# Patient Record
Sex: MALE | Race: BLACK OR AFRICAN AMERICAN | Employment: FULL TIME | URBAN - METROPOLITAN AREA
[De-identification: names, ages, dates, MRNs, and addresses within clinical notes are randomized per-mention and may not be internally consistent; named-entity substitution may affect disease eponyms.]

---

## 2023-11-22 DIAGNOSIS — L70.0 ACNE VULGARIS: Primary | ICD-10-CM

## 2023-11-27 RX ORDER — BENZOYL PEROXIDE 50 MG/ML
LIQUID TOPICAL
Qty: 148 G | Refills: 9 | Status: SHIPPED | OUTPATIENT
Start: 2023-11-27 | End: 2024-06-04 | Stop reason: WASHOUT

## 2024-01-15 ENCOUNTER — APPOINTMENT (OUTPATIENT)
Dept: DERMATOLOGY | Facility: CLINIC | Age: 29
End: 2024-01-15
Payer: COMMERCIAL

## 2024-06-04 ENCOUNTER — TELEMEDICINE (OUTPATIENT)
Dept: DERMATOLOGY | Facility: CLINIC | Age: 29
End: 2024-06-04
Payer: COMMERCIAL

## 2024-06-04 DIAGNOSIS — L70.0 ACNE VULGARIS: Primary | ICD-10-CM

## 2024-06-04 PROCEDURE — 99214 OFFICE O/P EST MOD 30 MIN: CPT | Performed by: DERMATOLOGY

## 2024-06-04 RX ORDER — BENZOYL PEROXIDE 100 MG/ML
LIQUID TOPICAL EVERY MORNING
Qty: 296 ML | Refills: 11 | Status: SHIPPED | OUTPATIENT
Start: 2024-06-04 | End: 2025-06-04

## 2024-06-04 RX ORDER — TRETINOIN 0.25 MG/G
CREAM TOPICAL
Qty: 90 G | Refills: 11 | Status: SHIPPED | OUTPATIENT
Start: 2024-06-04

## 2024-06-04 RX ORDER — CLINDAMYCIN PHOSPHATE 10 UG/ML
LOTION TOPICAL 2 TIMES DAILY
Qty: 240 ML | Refills: 11 | Status: SHIPPED | OUTPATIENT
Start: 2024-06-04 | End: 2025-06-04

## 2024-06-04 NOTE — PROGRESS NOTES
Subjective     Les Lopez is a 29 y.o. male who presents for the following: No chief complaint on file..     Patient last saw Dr. Onel Carballo 1/2023 for his acne. Patient had been on BPO wash, clindamycin lotion, and tazarac 0.05% gel; however, he had never really used the tazarac and he has been off of the BPO and clindamycin for several months. In addition, he would rinse off the BPO immediately after applying to the skin instead of leaving it on for a few minutes. Dr. Onel Carballo also prescribed the patient PO doxycyline but it made him nauseous so he was only on it for a week. Dr. Onel Carballo had discussed possible Accutane with patient in the past, but his cholesterol/triglycerides are quite elevated and his PCP advised him not to start on the medication.      Review of Systems:  No other skin or systemic complaints other than what is documented elsewhere in the note.    The following portions of the chart were reviewed this encounter and updated as appropriate:   Allergies         Skin Cancer History  No skin cancer on file.      Specialty Problems    None       Objective   Well appearing patient in no apparent distress; mood and affect are within normal limits.    A focused skin examination was performed. All findings within normal limits unless otherwise noted below.    Assessment/Plan   1. Acne vulgaris  Chest (Upper Torso, Anterior), Head - Anterior (Face), Torso - Posterior (Back)  Involving the back/chest > face are scattered comedones and inflammatory papulopustules with PIH.     Acne vulgaris; comedonal and papulonodular with PIH  - Back/chest > face   - Given that patient had not been using the topical medications currently in the past, will restart his topical regimen and assess improvement in 6 months   - START BPO 10% wash daily to face and body. Instructed patient to leave on skin for 5-10 minutes before rinsing off.   - START clindamycin 1% lotion BID to face and body.   - START  tretinoin 0.025% at night to face and body. Instructed patient to apply pea-sized amount to whole face and thin layer to affected areas on the trunk starting 2-3x a week and increasing to nightly as tolerated.  - Doxycyline made patient nauseous in the past.  - Patient is not a good candidate for isotretinoin given lab abnormalities and difficulty with compliance.  - Follow up [facility] in 6 months     Related Procedures  Follow Up In Dermatology - Established Patient    Related Medications  benzoyl peroxide (PanoxyL) 10 % external wash  Apply topically once daily in the morning. Apply to face, chest, back. Leave on skin for 5-10 minutes before rinsing off.    clindamycin (Cleocin T) 1 % lotion  Apply topically 2 times a day. Apply to face, chest, back twice daily    tretinoin (Retin-A) 0.025 % cream  Apply a small 1/2 pea sized amount to clean dry face at bedtime. Apply thin layer to chest/back at bedtime. Start off 2x/week and increase as tolerated.      RTC in 6 months for [facility]  Tracy Herrera MD     I saw and evaluated the patient. I personally obtained the key and critical portions of the history and physical exam or was physically present for key and critical portions performed by the resident/fellow. I reviewed the resident/fellow's documentation and discussed the patient with the resident/fellow. I agree with the resident/fellow's medical decision making as documented in the note.    Sean Sparks MD PhD

## 2024-10-06 ENCOUNTER — OFFICE VISIT (OUTPATIENT)
Dept: URGENT CARE | Age: 29
End: 2024-10-06
Payer: COMMERCIAL

## 2024-10-06 VITALS
OXYGEN SATURATION: 98 % | TEMPERATURE: 98.2 F | HEART RATE: 70 BPM | SYSTOLIC BLOOD PRESSURE: 140 MMHG | DIASTOLIC BLOOD PRESSURE: 87 MMHG | RESPIRATION RATE: 16 BRPM

## 2024-10-06 DIAGNOSIS — Z72.51 HIGH RISK HETEROSEXUAL BEHAVIOR: Primary | ICD-10-CM

## 2024-10-06 DIAGNOSIS — Z20.2 STD EXPOSURE: ICD-10-CM

## 2024-10-06 LAB
POC APPEARANCE, URINE: CLEAR
POC BLOOD, URINE: NEGATIVE
POC COLOR, URINE: YELLOW
POC GLUCOSE, URINE: NEGATIVE MG/DL
POC KETONES, URINE: ABNORMAL MG/DL
POC LEUKOCYTES, URINE: ABNORMAL
POC NITRITE,URINE: NEGATIVE
POC PH, URINE: 6 PH
POC PROTEIN, URINE: ABNORMAL MG/DL
POC SPECIFIC GRAVITY, URINE: 1.02
POC UROBILINOGEN, URINE: 1 EU/DL

## 2024-10-06 PROCEDURE — 87086 URINE CULTURE/COLONY COUNT: CPT

## 2024-10-06 PROCEDURE — 87591 N.GONORRHOEAE DNA AMP PROB: CPT

## 2024-10-06 PROCEDURE — 87661 TRICHOMONAS VAGINALIS AMPLIF: CPT

## 2024-10-06 PROCEDURE — 87491 CHLMYD TRACH DNA AMP PROBE: CPT

## 2024-10-06 PROCEDURE — 99204 OFFICE O/P NEW MOD 45 MIN: CPT | Performed by: NURSE PRACTITIONER

## 2024-10-06 PROCEDURE — 81003 URINALYSIS AUTO W/O SCOPE: CPT | Performed by: NURSE PRACTITIONER

## 2024-10-06 ASSESSMENT — ENCOUNTER SYMPTOMS
HEMATOLOGIC/LYMPHATIC NEGATIVE: 1
GASTROINTESTINAL NEGATIVE: 1
FLANK PAIN: 0
MUSCULOSKELETAL NEGATIVE: 1
DYSURIA: 0
NEUROLOGICAL NEGATIVE: 1
CONSTITUTIONAL NEGATIVE: 1
RESPIRATORY NEGATIVE: 1
CARDIOVASCULAR NEGATIVE: 1
PSYCHIATRIC NEGATIVE: 1
HEMATURIA: 0
FREQUENCY: 0

## 2024-10-06 NOTE — PROGRESS NOTES
Subjective   Patient ID: Les Lopez is a 29 y.o. male. They present today with a chief complaint of Exposure to STD.    History of Present Illness  28 yo male presents with c/o penile discharge and concern about STIs, had testing with PCP previously, 1 new partner since, no consistent condom usage.        Exposure to STD      Past Medical History  Allergies as of 10/06/2024    (No Known Allergies)       (Not in a hospital admission)       No past medical history on file.    No past surgical history on file.         Review of Systems  Review of Systems   Constitutional: Negative.    Respiratory: Negative.     Cardiovascular: Negative.    Gastrointestinal: Negative.    Genitourinary:  Positive for penile discharge. Negative for dysuria, flank pain, frequency, hematuria, penile pain, penile swelling, scrotal swelling and testicular pain.   Musculoskeletal: Negative.    Neurological: Negative.    Hematological: Negative.    Psychiatric/Behavioral: Negative.                                    Objective    Vitals:    10/06/24 1221   BP: 140/87   Pulse: 70   Resp: 16   Temp: 36.8 °C (98.2 °F)   SpO2: 98%     No LMP for male patient.    Physical Exam    Physical Exam  Constitutional:       Appearance: Normal appearance.   Cardiovascular:      Rate and Rhythm: Normal rate and regular rhythm.   Pulmonary:      Effort: Pulmonary effort is normal.      Breath sounds: Normal breath sounds.   Abdominal:      General: Abdomen is flat.      Palpations: Abdomen is soft.   Declined GUexam  Musculoskeletal:         General: Normal range of motion.   Skin:     General: Skin is warm and dry.      Capillary Refill: Capillary refill takes less than 2 seconds.   Neurological:      General: No focal deficit present.      Mental Status: He is alert and oriented to person, place, and time.   Psychiatric:         Mood and Affect: Mood normal.      Procedures    Point of Care Test & Imaging Results from this  visit  Results for orders placed or performed in visit on 10/06/24   POCT UA Automated manually resulted   Result Value Ref Range    POC Color, Urine Yellow Straw, Yellow, Light-Yellow    POC Appearance, Urine Clear Clear    POC Glucose, Urine NEGATIVE NEGATIVE mg/dl    POC Ketones, Urine 40 (2+) (A) NEGATIVE mg/dl    POC Specific Gravity, Urine 1.025 1.005 - 1.035    POC Blood, Urine NEGATIVE NEGATIVE    POC PH, Urine 6.0 No Reference Range Established PH    POC Protein, Urine 30 (1+) NEGATIVE, 30 (1+) mg/dl    POC Urobilinogen, Urine 1.0 0.2, 1.0 EU/DL    Poc Nitrite, Urine NEGATIVE NEGATIVE    POC Leukocytes, Urine TRACE (A) NEGATIVE      No results found.    Diagnostic study results (if any) were reviewed by MARIBETH King.    Assessment/Plan   Allergies, medications, history, and pertinent labs/EKGs/Imaging reviewed by MARIBETH King.     Medical Decision Making  STD (testing only)- MDM- Patient presenting for routine STD testing due to high risk sexual  behavior. Will hold treatment at this time and treat based on lab results if necessary.   Return to clinic if new symptoms occur.   Discussed importance of safe sexual practices with every sexual encounter: condom use, abstinence  Discussed importance of [facility], [facility] for additional [facility]I testing. STI testing limited in urgent care environment.        Orders and Diagnoses  Diagnoses and all orders for this visit:  High risk heterosexual behavior  STD exposure  -     POCT UA Automated manually resulted      Medical Admin Record      Patient disposition: Home    Electronically signed by MARIBETH King  1:31 PM

## 2024-10-07 ENCOUNTER — LAB REQUISITION (OUTPATIENT)
Dept: LAB | Facility: HOSPITAL | Age: 29
End: 2024-10-07
Payer: COMMERCIAL

## 2024-10-07 DIAGNOSIS — Z20.2 CONTACT WITH AND (SUSPECTED) EXPOSURE TO INFECTIONS WITH A PREDOMINANTLY SEXUAL MODE OF TRANSMISSION: ICD-10-CM

## 2024-10-07 DIAGNOSIS — Z72.51 HIGH RISK HETEROSEXUAL BEHAVIOR: ICD-10-CM

## 2024-10-07 LAB — T VAGINALIS RRNA SPEC QL NAA+PROBE: NEGATIVE

## 2024-10-08 LAB — BACTERIA UR CULT: NORMAL

## 2024-10-11 ENCOUNTER — APPOINTMENT (OUTPATIENT)
Dept: URGENT CARE | Age: 29
End: 2024-10-11
Payer: COMMERCIAL

## 2024-10-11 LAB
C TRACH RRNA SPEC QL NAA+PROBE: NEGATIVE
N GONORRHOEA DNA SPEC QL PROBE+SIG AMP: NEGATIVE

## 2024-12-04 ENCOUNTER — APPOINTMENT (OUTPATIENT)
Dept: DERMATOLOGY | Facility: CLINIC | Age: 29
End: 2024-12-04
Payer: COMMERCIAL